# Patient Record
Sex: FEMALE | Race: WHITE | NOT HISPANIC OR LATINO | Employment: FULL TIME | URBAN - METROPOLITAN AREA
[De-identification: names, ages, dates, MRNs, and addresses within clinical notes are randomized per-mention and may not be internally consistent; named-entity substitution may affect disease eponyms.]

---

## 2019-05-23 ENCOUNTER — OFFICE VISIT (OUTPATIENT)
Dept: ORTHOPEDICS | Facility: CLINIC | Age: 59
End: 2019-05-23
Payer: COMMERCIAL

## 2019-05-23 VITALS
SYSTOLIC BLOOD PRESSURE: 150 MMHG | HEART RATE: 67 BPM | HEIGHT: 67 IN | DIASTOLIC BLOOD PRESSURE: 98 MMHG | BODY MASS INDEX: 26.53 KG/M2 | WEIGHT: 169 LBS

## 2019-05-23 DIAGNOSIS — M19.131 SLAC (SCAPHOLUNATE ADVANCED COLLAPSE) OF WRIST, RIGHT: Primary | ICD-10-CM

## 2019-05-23 PROCEDURE — 99203 OFFICE O/P NEW LOW 30 MIN: CPT | Mod: 57,S$GLB,, | Performed by: ORTHOPAEDIC SURGERY

## 2019-05-23 PROCEDURE — 99999 PR PBB SHADOW E&M-NEW PATIENT-LVL III: CPT | Mod: PBBFAC,,, | Performed by: ORTHOPAEDIC SURGERY

## 2019-05-23 PROCEDURE — 99999 PR PBB SHADOW E&M-NEW PATIENT-LVL III: ICD-10-PCS | Mod: PBBFAC,,, | Performed by: ORTHOPAEDIC SURGERY

## 2019-05-23 PROCEDURE — 99203 PR OFFICE/OUTPT VISIT, NEW, LEVL III, 30-44 MIN: ICD-10-PCS | Mod: 57,S$GLB,, | Performed by: ORTHOPAEDIC SURGERY

## 2019-05-23 PROCEDURE — 3008F BODY MASS INDEX DOCD: CPT | Mod: CPTII,S$GLB,, | Performed by: ORTHOPAEDIC SURGERY

## 2019-05-23 PROCEDURE — 3008F PR BODY MASS INDEX (BMI) DOCUMENTED: ICD-10-PCS | Mod: CPTII,S$GLB,, | Performed by: ORTHOPAEDIC SURGERY

## 2019-05-23 RX ORDER — MELOXICAM 15 MG/1
15 TABLET ORAL DAILY
Refills: 1 | COMMUNITY
Start: 2019-03-28 | End: 2019-07-17

## 2019-05-23 NOTE — H&P
"5/23/2019    Chief Complaint:  Chief Complaint   Patient presents with    Wrist Pain     right wrist pain for approx 6 mos       HPI:  Lakeshia Lopes is a 58 y.o. female, who presents to clinic today she has a history of right wrist arthritis. She states that her wrist has been hurting her significantly over the last 6 months.  She has tried bracing without significant relief.  She has even had a steroid injection in her wrist which is not provide significant relief.  She has seen Dr. echavarria to recur suggested surgery and she is here today for a 2nd opinion.  She has no other complaints.    PMHX:  History reviewed. No pertinent past medical history.    PSHX:  Past Surgical History:   Procedure Laterality Date    CARPAL TUNNEL RELEASE Right     TUBAL LIGATION         FMHX:  Family History   Problem Relation Age of Onset    Multiple sclerosis Mother     Cancer Father        SOCHX:  Social History     Tobacco Use    Smoking status: Never Smoker    Smokeless tobacco: Former User   Substance Use Topics    Alcohol use: Yes     Frequency: Never     Comment: social       ALLERGIES:  Patient has no known allergies.    CURRENT MEDICATIONS:  Current Outpatient Medications on File Prior to Visit   Medication Sig Dispense Refill    meloxicam (MOBIC) 15 MG tablet Take 15 mg by mouth once daily.   1     No current facility-administered medications on file prior to visit.        REVIEW OF SYSTEMS:  Review of Systems   Constitutional: Negative.    HENT: Negative for hearing loss, nosebleeds and sore throat.    Respiratory: Negative for shortness of breath and wheezing.    Cardiovascular: Negative for chest pain and palpitations.   Gastrointestinal: Negative for heartburn, nausea and vomiting.   Genitourinary: Negative for dysuria and urgency.   Skin: Negative.    Neurological: Negative for seizures, loss of consciousness and weakness.       GENERAL PHYSICAL EXAM:   BP (!) 150/98   Pulse 67   Ht 5' 7" (1.702 m)   Wt " 76.7 kg (169 lb)   BMI 26.47 kg/m²    GEN: well developed, well nourished, no acute distress   HENT: Normocephalic, atraumatic   EYES: No discharge, conjunctiva normal   NECK: Supple, non-tender   PULM: No wheezing, no respiratory distress   CV: RRR   ABD: Soft, non-tender    ORTHO EXAM:   Examination of the right hand and wrist reveals that there are changes consistent with arthritis in the wrist.  There is mild edema.  There are no skin changes or erythema.  Palpation does produce tenderness over the radiocarpal joint. There is mild volar tenderness as well.  Wrist range of motion is limited with extension of 20° and flexion of 20°.  She is able make a full composite fist and fully extend her fingers.  Sensation is intact in the median radial and ulnar distributions. She has a 2+ radial pulse.    RADIOLOGY:   X-rays of the right wrist have been reviewed she is noted to have scapholunate advanced collapse with severe degenerative changes about the scaphoid.  The capitate appears to be intact.  The lunate is mildly effective but the lunate fossa appears to be intact.    ASSESSMENT:   Right wrist scapholunate advanced collapse    PLAN:  1.  I have discussed treatment options with the patient. Due to the appearance of her x-ray I think she would do best with a proximal row carpectomy.  I did discuss the possibility of needing a total versus partial wrist arthrodesis.  I also discussed the possibility of conservative treatments.  Patient states that she has failed conservative treatments and would like to consider surgical option    2.  She will call or follow up with me if she would like to proceed with attempt at proximal row carpectomy

## 2019-05-28 ENCOUNTER — TELEPHONE (OUTPATIENT)
Dept: ORTHOPEDICS | Facility: CLINIC | Age: 59
End: 2019-05-28

## 2019-05-28 NOTE — TELEPHONE ENCOUNTER
I called pt and scheduled her to see Dr Irving on 5/29/19 at 2:40pm to discuss and address consents for surgery to start process of scheduling surgery.

## 2019-05-28 NOTE — TELEPHONE ENCOUNTER
----- Message from Russel Simpson sent at 5/28/2019  9:37 AM CDT -----  Contact: same  Patient called in and stated she was seen on 5/23/19 for her right wrist issues & would like a call back to go ahead & schedule her surgery.  Patient call back number is [phone]

## 2019-05-29 ENCOUNTER — OFFICE VISIT (OUTPATIENT)
Dept: ORTHOPEDICS | Facility: CLINIC | Age: 59
End: 2019-05-29
Payer: COMMERCIAL

## 2019-05-29 VITALS
HEART RATE: 64 BPM | SYSTOLIC BLOOD PRESSURE: 148 MMHG | WEIGHT: 169 LBS | BODY MASS INDEX: 26.53 KG/M2 | DIASTOLIC BLOOD PRESSURE: 90 MMHG | HEIGHT: 67 IN

## 2019-05-29 DIAGNOSIS — M19.131 SCAPHOLUNATE ADVANCED COLLAPSE OF RIGHT WRIST: Primary | ICD-10-CM

## 2019-05-29 DIAGNOSIS — Z01.818 PRE-OP EXAMINATION: Primary | ICD-10-CM

## 2019-05-29 PROCEDURE — 3008F PR BODY MASS INDEX (BMI) DOCUMENTED: ICD-10-PCS | Mod: CPTII,S$GLB,, | Performed by: ORTHOPAEDIC SURGERY

## 2019-05-29 PROCEDURE — 99999 PR PBB SHADOW E&M-EST. PATIENT-LVL IV: ICD-10-PCS | Mod: PBBFAC,,, | Performed by: ORTHOPAEDIC SURGERY

## 2019-05-29 PROCEDURE — 3008F BODY MASS INDEX DOCD: CPT | Mod: CPTII,S$GLB,, | Performed by: ORTHOPAEDIC SURGERY

## 2019-05-29 PROCEDURE — 99213 PR OFFICE/OUTPT VISIT, EST, LEVL III, 20-29 MIN: ICD-10-PCS | Mod: S$GLB,,, | Performed by: ORTHOPAEDIC SURGERY

## 2019-05-29 PROCEDURE — 99999 PR PBB SHADOW E&M-EST. PATIENT-LVL IV: CPT | Mod: PBBFAC,,, | Performed by: ORTHOPAEDIC SURGERY

## 2019-05-29 PROCEDURE — 99213 OFFICE O/P EST LOW 20 MIN: CPT | Mod: S$GLB,,, | Performed by: ORTHOPAEDIC SURGERY

## 2019-05-29 RX ORDER — LIDOCAINE HYDROCHLORIDE 10 MG/ML
1 INJECTION, SOLUTION EPIDURAL; INFILTRATION; INTRACAUDAL; PERINEURAL ONCE
Status: CANCELLED | OUTPATIENT
Start: 2019-05-29 | End: 2019-05-29

## 2019-05-29 NOTE — H&P
"5/29/2019    Chief Complaint:  Chief Complaint   Patient presents with    Wrist Pain     right wrist pain (scapholunate advanced collapse): discuss sx       HPI:  Lakeshia Lopes is a 58 y.o. female, who presents to clinic today she has a history of scapholunate advanced collapse.  She has been considering surgery. She is here today for discussion of treatment options.  She has no new complaints.  She still complains of right wrist pain    PMHX:  History reviewed. No pertinent past medical history.    PSHX:  Past Surgical History:   Procedure Laterality Date    CARPAL TUNNEL RELEASE Right     TUBAL LIGATION         FMHX:  Family History   Problem Relation Age of Onset    Multiple sclerosis Mother     Cancer Father        SOCHX:  Social History     Tobacco Use    Smoking status: Never Smoker    Smokeless tobacco: Former User   Substance Use Topics    Alcohol use: Yes     Frequency: Never     Comment: social       ALLERGIES:  Patient has no known allergies.    CURRENT MEDICATIONS:  Current Outpatient Medications on File Prior to Visit   Medication Sig Dispense Refill    meloxicam (MOBIC) 15 MG tablet Take 15 mg by mouth once daily.   1     No current facility-administered medications on file prior to visit.        REVIEW OF SYSTEMS:  Review of Systems   Constitutional: Negative.    HENT: Negative for hearing loss, nosebleeds and sore throat.    Respiratory: Negative for shortness of breath and wheezing.    Cardiovascular: Negative for chest pain and palpitations.   Gastrointestinal: Negative for heartburn, nausea and vomiting.   Genitourinary: Negative for dysuria and urgency.   Skin: Negative.    Neurological: Negative for seizures, loss of consciousness and weakness.       GENERAL PHYSICAL EXAM:   BP (!) 148/90   Pulse 64   Ht 5' 7" (1.702 m)   Wt 76.7 kg (169 lb)   BMI 26.47 kg/m²    GEN: well developed, well nourished, no acute distress   HENT: Normocephalic, atraumatic   EYES: No discharge, " conjunctiva normal   NECK: Supple, non-tender   PULM: No wheezing, no respiratory distress   CV: RRR   ABD: Soft, non-tender    ORTHO EXAM:   Examination of the right hand and wrist reveals that there are changes consistent with arthritis in the wrist.  There is mild edema.  There are no skin changes or erythema.  Palpation does produce tenderness over the radiocarpal joint. There is mild volar tenderness as well.  Wrist range of motion is limited with extension of 20° and flexion of 20°.  She is able make a full composite fist and fully extend her fingers.  Sensation is intact in the median radial and ulnar distributions. She has a 2+ radial pulse.    RADIOLOGY:   X-rays of the right wrist have been reviewed.  She is noted have scapholunate advanced collapse.  There is severe arthritis at the radioscaphoid joint. The capitate appears to be preserved as well as the lunate fossa.    ASSESSMENT:   Right wrist scapholunate advanced collapse    PLAN:  1.  I have discussed treatment options with the patient. She states the conservative treatments are not providing relief.  I have discussed the possibility of proximal row carpectomy versus wrist arthrodesis.  Patient states an understanding of the risks and benefits of these procedures and has provided informed consent    2.  Will proceed with right wrist proximal row carpectomy under general anesthesia    3.  Will send her for preoperative CBC, BMP, and EKG    4.  She will follow up with me 2 weeks postoperatively

## 2019-05-29 NOTE — PATIENT INSTRUCTIONS
Surgery Instructions:     Your surgery is scheduled on 6/18/2019  at the surgery center: [address], 1st floor, second entrance.    The pre-op department will be in contact with you prior to your procedure to review medications and instructions.       Nothing to eat or drink after midnight prior to day of surgery.    You should STOP taking any blood thinners 7 days prior to surgery.     All preoperative testing should be done as soon as possible as it may be needed to obtain clearance.    The surgery center will contact you the day prior to surgery to advise you of your arrival time for surgery.     Your post op appointment is scheduled on 7/3/19 at 8:00 AM.

## 2019-06-04 ENCOUNTER — CLINICAL SUPPORT (OUTPATIENT)
Dept: CARDIOLOGY | Facility: CLINIC | Age: 59
End: 2019-06-04
Payer: COMMERCIAL

## 2019-06-04 ENCOUNTER — LAB VISIT (OUTPATIENT)
Dept: LAB | Facility: HOSPITAL | Age: 59
End: 2019-06-04
Attending: ORTHOPAEDIC SURGERY
Payer: COMMERCIAL

## 2019-06-04 DIAGNOSIS — Z01.818 PRE-OP EXAMINATION: ICD-10-CM

## 2019-06-04 LAB
ANION GAP SERPL CALC-SCNC: 7 MMOL/L (ref 8–16)
BASOPHILS # BLD AUTO: 0.05 K/UL (ref 0–0.2)
BASOPHILS NFR BLD: 0.5 % (ref 0–1.9)
BUN SERPL-MCNC: 23 MG/DL (ref 6–20)
CALCIUM SERPL-MCNC: 9.3 MG/DL (ref 8.7–10.5)
CHLORIDE SERPL-SCNC: 107 MMOL/L (ref 95–110)
CO2 SERPL-SCNC: 27 MMOL/L (ref 23–29)
CREAT SERPL-MCNC: 1.1 MG/DL (ref 0.5–1.4)
DIFFERENTIAL METHOD: NORMAL
EOSINOPHIL # BLD AUTO: 0.1 K/UL (ref 0–0.5)
EOSINOPHIL NFR BLD: 1 % (ref 0–8)
ERYTHROCYTE [DISTWIDTH] IN BLOOD BY AUTOMATED COUNT: 11.8 % (ref 11.5–14.5)
EST. GFR  (AFRICAN AMERICAN): >60 ML/MIN/1.73 M^2
EST. GFR  (NON AFRICAN AMERICAN): 55.1 ML/MIN/1.73 M^2
GLUCOSE SERPL-MCNC: 80 MG/DL (ref 70–110)
HCT VFR BLD AUTO: 40 % (ref 37–48.5)
HGB BLD-MCNC: 13.2 G/DL (ref 12–16)
IMM GRANULOCYTES # BLD AUTO: 0.03 K/UL (ref 0–0.04)
IMM GRANULOCYTES NFR BLD AUTO: 0.3 % (ref 0–0.5)
LYMPHOCYTES # BLD AUTO: 3.1 K/UL (ref 1–4.8)
LYMPHOCYTES NFR BLD: 32.4 % (ref 18–48)
MCH RBC QN AUTO: 30.4 PG (ref 27–31)
MCHC RBC AUTO-ENTMCNC: 33 G/DL (ref 32–36)
MCV RBC AUTO: 92 FL (ref 82–98)
MONOCYTES # BLD AUTO: 0.6 K/UL (ref 0.3–1)
MONOCYTES NFR BLD: 6.1 % (ref 4–15)
NEUTROPHILS # BLD AUTO: 5.7 K/UL (ref 1.8–7.7)
NEUTROPHILS NFR BLD: 59.7 % (ref 38–73)
NRBC BLD-RTO: 0 /100 WBC
PLATELET # BLD AUTO: 292 K/UL (ref 150–350)
PMV BLD AUTO: 9.9 FL (ref 9.2–12.9)
POTASSIUM SERPL-SCNC: 4.6 MMOL/L (ref 3.5–5.1)
RBC # BLD AUTO: 4.34 M/UL (ref 4–5.4)
SODIUM SERPL-SCNC: 141 MMOL/L (ref 136–145)
WBC # BLD AUTO: 9.55 K/UL (ref 3.9–12.7)

## 2019-06-04 PROCEDURE — 93000 ELECTROCARDIOGRAM COMPLETE: CPT | Mod: S$GLB,,, | Performed by: INTERNAL MEDICINE

## 2019-06-04 PROCEDURE — 93000 EKG 12-LEAD: ICD-10-PCS | Mod: S$GLB,,, | Performed by: INTERNAL MEDICINE

## 2019-06-04 PROCEDURE — 85025 COMPLETE CBC W/AUTO DIFF WBC: CPT

## 2019-06-04 PROCEDURE — 36415 COLL VENOUS BLD VENIPUNCTURE: CPT | Mod: PO

## 2019-06-04 PROCEDURE — 80048 BASIC METABOLIC PNL TOTAL CA: CPT

## 2019-06-11 ENCOUNTER — TELEPHONE (OUTPATIENT)
Dept: ORTHOPEDICS | Facility: CLINIC | Age: 59
End: 2019-06-11

## 2019-06-11 NOTE — TELEPHONE ENCOUNTER
----- Message from Escobar Kaur sent at 6/11/2019  3:05 PM CDT -----  Contact: pt  Please call Ms Lopes MRN:   [id number] at  for question about Sx

## 2019-06-11 NOTE — TELEPHONE ENCOUNTER
I called pt.  Pt called to inform me she has completed her labs and EKG.  I informed pt that the results are in her chart and will be reviewed by the anethesiologist.  Pt asked if she is still on for surgery.  I informed pt she will be contacted by the surgery center the day before surgery to notify pt time of arrival for surgery.  Pt verbalized understanding and had no other questions.

## 2019-06-13 ENCOUNTER — TELEPHONE (OUTPATIENT)
Dept: ORTHOPEDICS | Facility: CLINIC | Age: 59
End: 2019-06-13

## 2019-06-13 RX ORDER — GLUCOSAMINE/CHONDRO SU A 500-400 MG
1 TABLET ORAL 3 TIMES DAILY
COMMUNITY

## 2019-06-13 RX ORDER — ASPIRIN 81 MG/1
81 TABLET ORAL DAILY
COMMUNITY

## 2019-06-13 NOTE — TELEPHONE ENCOUNTER
Attempted to contact pt to notify her that I received her [place] paperwork and to ask pt when beginning date of incapacity should be listed on form.  No answer. Unable to leave message.

## 2019-06-14 NOTE — TELEPHONE ENCOUNTER
I called and informed pt that I have received her [facility] paperwork.  Pt reports her first day of incapacity with be the day of surgery.  I informed pt I will complete [facility] paperwork and fax to appropriate number.  Pt verbalized understanding and had no questions.

## 2019-06-17 ENCOUNTER — ANESTHESIA EVENT (OUTPATIENT)
Dept: SURGERY | Facility: HOSPITAL | Age: 59
End: 2019-06-17
Payer: COMMERCIAL

## 2019-06-17 DIAGNOSIS — M25.531 RIGHT WRIST PAIN: Primary | ICD-10-CM

## 2019-06-18 ENCOUNTER — ANESTHESIA (OUTPATIENT)
Dept: SURGERY | Facility: HOSPITAL | Age: 59
End: 2019-06-18
Payer: COMMERCIAL

## 2019-06-18 ENCOUNTER — HOSPITAL ENCOUNTER (OUTPATIENT)
Dept: RADIOLOGY | Facility: HOSPITAL | Age: 59
Discharge: HOME | End: 2019-06-18
Attending: ORTHOPAEDIC SURGERY | Admitting: ORTHOPAEDIC SURGERY
Payer: COMMERCIAL

## 2019-06-18 ENCOUNTER — HOSPITAL ENCOUNTER (OUTPATIENT)
Facility: HOSPITAL | Age: 59
Discharge: HOME | End: 2019-06-18
Attending: ORTHOPAEDIC SURGERY | Admitting: ORTHOPAEDIC SURGERY
Payer: COMMERCIAL

## 2019-06-18 DIAGNOSIS — M19.131 SCAPHOLUNATE ADVANCED COLLAPSE OF RIGHT WRIST: ICD-10-CM

## 2019-06-18 DIAGNOSIS — M25.531 RIGHT WRIST PAIN: ICD-10-CM

## 2019-06-18 PROCEDURE — 76942 PR U/S GUIDANCE FOR NEEDLE GUIDANCE: ICD-10-PCS | Mod: 26,,, | Performed by: ANESTHESIOLOGY

## 2019-06-18 PROCEDURE — 63600175 PHARM REV CODE 636 W HCPCS: Mod: PO | Performed by: NURSE ANESTHETIST, CERTIFIED REGISTERED

## 2019-06-18 PROCEDURE — 64772 PR TRANSECT OTHR SPINAL N,XTRADURAL: ICD-10-PCS | Mod: 51,RT,, | Performed by: ORTHOPAEDIC SURGERY

## 2019-06-18 PROCEDURE — 76942 ECHO GUIDE FOR BIOPSY: CPT | Mod: 26,,, | Performed by: ANESTHESIOLOGY

## 2019-06-18 PROCEDURE — 64415 PR NERVE BLOCK INJ, ANES/STEROID, BRACHIAL PLEXUS, INCL IMAG GUIDANCE: ICD-10-PCS | Mod: 59,RT,, | Performed by: ANESTHESIOLOGY

## 2019-06-18 PROCEDURE — 76000 FLUOROSCOPY <1 HR PHYS/QHP: CPT | Mod: TC,PO

## 2019-06-18 PROCEDURE — 64415 NJX AA&/STRD BRCH PLXS IMG: CPT | Mod: PO | Performed by: ANESTHESIOLOGY

## 2019-06-18 PROCEDURE — 37000008 HC ANESTHESIA 1ST 15 MINUTES: Mod: PO | Performed by: ORTHOPAEDIC SURGERY

## 2019-06-18 PROCEDURE — 76942 ECHO GUIDE FOR BIOPSY: CPT | Mod: PO | Performed by: ANESTHESIOLOGY

## 2019-06-18 PROCEDURE — 64415 NJX AA&/STRD BRCH PLXS IMG: CPT | Mod: 59,RT,, | Performed by: ANESTHESIOLOGY

## 2019-06-18 PROCEDURE — D9220A PRA ANESTHESIA: Mod: ANES,,, | Performed by: ANESTHESIOLOGY

## 2019-06-18 PROCEDURE — 64772 INCISION OF SPINAL NERVE: CPT | Mod: 51,RT,, | Performed by: ORTHOPAEDIC SURGERY

## 2019-06-18 PROCEDURE — 37000009 HC ANESTHESIA EA ADD 15 MINS: Mod: PO | Performed by: ORTHOPAEDIC SURGERY

## 2019-06-18 PROCEDURE — D9220A PRA ANESTHESIA: ICD-10-PCS | Mod: ANES,,, | Performed by: ANESTHESIOLOGY

## 2019-06-18 PROCEDURE — 25000003 PHARM REV CODE 250: Mod: PO | Performed by: ANESTHESIOLOGY

## 2019-06-18 PROCEDURE — 36000706: Mod: PO | Performed by: ORTHOPAEDIC SURGERY

## 2019-06-18 PROCEDURE — 36000707: Mod: PO | Performed by: ORTHOPAEDIC SURGERY

## 2019-06-18 PROCEDURE — 63600175 PHARM REV CODE 636 W HCPCS: Mod: PO | Performed by: ORTHOPAEDIC SURGERY

## 2019-06-18 PROCEDURE — 63600175 PHARM REV CODE 636 W HCPCS: Mod: PO | Performed by: ANESTHESIOLOGY

## 2019-06-18 PROCEDURE — 25230 PR REMOVAL OF RADIAL STYLOID: ICD-10-PCS | Mod: 51,RT,, | Performed by: ORTHOPAEDIC SURGERY

## 2019-06-18 PROCEDURE — D9220A PRA ANESTHESIA: ICD-10-PCS | Mod: CRNA,,, | Performed by: NURSE ANESTHETIST, CERTIFIED REGISTERED

## 2019-06-18 PROCEDURE — C9290 INJ, BUPIVACAINE LIPOSOME: HCPCS | Mod: PO | Performed by: ANESTHESIOLOGY

## 2019-06-18 PROCEDURE — 25215 PR REMOVAL OF PROX ROW CARPAL BONES: ICD-10-PCS | Mod: RT,,, | Performed by: ORTHOPAEDIC SURGERY

## 2019-06-18 PROCEDURE — 71000033 HC RECOVERY, INTIAL HOUR: Mod: PO | Performed by: ORTHOPAEDIC SURGERY

## 2019-06-18 PROCEDURE — 25215 REMOVAL OF WRIST BONES: CPT | Mod: RT,,, | Performed by: ORTHOPAEDIC SURGERY

## 2019-06-18 PROCEDURE — S0020 INJECTION, BUPIVICAINE HYDRO: HCPCS | Mod: PO | Performed by: ANESTHESIOLOGY

## 2019-06-18 PROCEDURE — D9220A PRA ANESTHESIA: Mod: CRNA,,, | Performed by: NURSE ANESTHETIST, CERTIFIED REGISTERED

## 2019-06-18 PROCEDURE — 25000003 PHARM REV CODE 250: Mod: PO | Performed by: NURSE ANESTHETIST, CERTIFIED REGISTERED

## 2019-06-18 PROCEDURE — 71000015 HC POSTOP RECOV 1ST HR: Mod: PO | Performed by: ORTHOPAEDIC SURGERY

## 2019-06-18 PROCEDURE — 27200651 HC AIRWAY, LMA: Mod: PO | Performed by: NURSE ANESTHETIST, CERTIFIED REGISTERED

## 2019-06-18 PROCEDURE — 25000003 PHARM REV CODE 250: Mod: PO | Performed by: ORTHOPAEDIC SURGERY

## 2019-06-18 PROCEDURE — 25230 PARTIAL REMOVAL OF RADIUS: CPT | Mod: 51,RT,, | Performed by: ORTHOPAEDIC SURGERY

## 2019-06-18 PROCEDURE — 27200750 HC INSULATED NEEDLE/ STIMUPLEX: Mod: PO | Performed by: ANESTHESIOLOGY

## 2019-06-18 RX ORDER — MIDAZOLAM HYDROCHLORIDE 1 MG/ML
0.5 INJECTION INTRAMUSCULAR; INTRAVENOUS
Status: DISCONTINUED | OUTPATIENT
Start: 2019-06-18 | End: 2019-06-18 | Stop reason: HOSPADM

## 2019-06-18 RX ORDER — FENTANYL CITRATE 50 UG/ML
INJECTION, SOLUTION INTRAMUSCULAR; INTRAVENOUS
Status: DISCONTINUED | OUTPATIENT
Start: 2019-06-18 | End: 2019-06-18

## 2019-06-18 RX ORDER — ONDANSETRON 2 MG/ML
INJECTION INTRAMUSCULAR; INTRAVENOUS
Status: DISCONTINUED | OUTPATIENT
Start: 2019-06-18 | End: 2019-06-18

## 2019-06-18 RX ORDER — FENTANYL CITRATE 50 UG/ML
25 INJECTION, SOLUTION INTRAMUSCULAR; INTRAVENOUS EVERY 5 MIN PRN
Status: DISCONTINUED | OUTPATIENT
Start: 2019-06-18 | End: 2019-06-18 | Stop reason: HOSPADM

## 2019-06-18 RX ORDER — HYDROMORPHONE HYDROCHLORIDE 2 MG/ML
0.2 INJECTION, SOLUTION INTRAMUSCULAR; INTRAVENOUS; SUBCUTANEOUS EVERY 5 MIN PRN
Status: DISCONTINUED | OUTPATIENT
Start: 2019-06-18 | End: 2019-06-18 | Stop reason: HOSPADM

## 2019-06-18 RX ORDER — LIDOCAINE HYDROCHLORIDE 10 MG/ML
1 INJECTION, SOLUTION EPIDURAL; INFILTRATION; INTRACAUDAL; PERINEURAL ONCE
Status: DISCONTINUED | OUTPATIENT
Start: 2019-06-18 | End: 2019-06-18 | Stop reason: HOSPADM

## 2019-06-18 RX ORDER — KETAMINE HYDROCHLORIDE 100 MG/ML
INJECTION, SOLUTION INTRAMUSCULAR; INTRAVENOUS
Status: DISCONTINUED | OUTPATIENT
Start: 2019-06-18 | End: 2019-06-18

## 2019-06-18 RX ORDER — SODIUM CHLORIDE, SODIUM LACTATE, POTASSIUM CHLORIDE, CALCIUM CHLORIDE 600; 310; 30; 20 MG/100ML; MG/100ML; MG/100ML; MG/100ML
INJECTION, SOLUTION INTRAVENOUS CONTINUOUS
Status: DISCONTINUED | OUTPATIENT
Start: 2019-06-18 | End: 2019-06-18 | Stop reason: HOSPADM

## 2019-06-18 RX ORDER — CEFAZOLIN SODIUM 2 G/50ML
2 SOLUTION INTRAVENOUS
Status: COMPLETED | OUTPATIENT
Start: 2019-06-18 | End: 2019-06-18

## 2019-06-18 RX ORDER — OXYCODONE HYDROCHLORIDE 10 MG/1
10 TABLET ORAL EVERY 4 HOURS PRN
Qty: 22 TABLET | Refills: 0 | Status: SHIPPED | OUTPATIENT
Start: 2019-06-18 | End: 2019-07-17

## 2019-06-18 RX ORDER — EPHEDRINE SULFATE 50 MG/ML
INJECTION, SOLUTION INTRAVENOUS
Status: DISCONTINUED | OUTPATIENT
Start: 2019-06-18 | End: 2019-06-18

## 2019-06-18 RX ORDER — OXYCODONE HYDROCHLORIDE 5 MG/1
5 TABLET ORAL
Status: DISCONTINUED | OUTPATIENT
Start: 2019-06-18 | End: 2019-06-18 | Stop reason: HOSPADM

## 2019-06-18 RX ORDER — DEXAMETHASONE SODIUM PHOSPHATE 4 MG/ML
8 INJECTION, SOLUTION INTRA-ARTICULAR; INTRALESIONAL; INTRAMUSCULAR; INTRAVENOUS; SOFT TISSUE
Status: COMPLETED | OUTPATIENT
Start: 2019-06-18 | End: 2019-06-18

## 2019-06-18 RX ORDER — SODIUM CHLORIDE 0.9 G/100ML
IRRIGANT IRRIGATION
Status: DISCONTINUED | OUTPATIENT
Start: 2019-06-18 | End: 2019-06-18 | Stop reason: HOSPADM

## 2019-06-18 RX ORDER — ONDANSETRON 4 MG/1
4 TABLET, ORALLY DISINTEGRATING ORAL EVERY 8 HOURS PRN
Qty: 4 TABLET | Refills: 0 | Status: SHIPPED | OUTPATIENT
Start: 2019-06-18 | End: 2019-07-17

## 2019-06-18 RX ORDER — PROPOFOL 10 MG/ML
VIAL (ML) INTRAVENOUS
Status: DISCONTINUED | OUTPATIENT
Start: 2019-06-18 | End: 2019-06-18

## 2019-06-18 RX ORDER — GLYCOPYRROLATE 0.2 MG/ML
INJECTION INTRAMUSCULAR; INTRAVENOUS
Status: DISCONTINUED | OUTPATIENT
Start: 2019-06-18 | End: 2019-06-18

## 2019-06-18 RX ORDER — LIDOCAINE HCL/PF 100 MG/5ML
SYRINGE (ML) INTRAVENOUS
Status: DISCONTINUED | OUTPATIENT
Start: 2019-06-18 | End: 2019-06-18

## 2019-06-18 RX ORDER — BUPIVACAINE HYDROCHLORIDE 5 MG/ML
INJECTION, SOLUTION EPIDURAL; INTRACAUDAL
Status: COMPLETED | OUTPATIENT
Start: 2019-06-18 | End: 2019-06-18

## 2019-06-18 RX ADMIN — MIDAZOLAM HYDROCHLORIDE 1 MG: 1 INJECTION, SOLUTION INTRAMUSCULAR; INTRAVENOUS at 08:06

## 2019-06-18 RX ADMIN — FENTANYL CITRATE 50 MCG: 50 INJECTION INTRAMUSCULAR; INTRAVENOUS at 08:06

## 2019-06-18 RX ADMIN — SODIUM CHLORIDE, SODIUM LACTATE, POTASSIUM CHLORIDE, AND CALCIUM CHLORIDE: .6; .31; .03; .02 INJECTION, SOLUTION INTRAVENOUS at 08:06

## 2019-06-18 RX ADMIN — PROPOFOL 180 MG: 10 INJECTION, EMULSION INTRAVENOUS at 09:06

## 2019-06-18 RX ADMIN — FENTANYL CITRATE 50 MCG: 50 INJECTION, SOLUTION INTRAMUSCULAR; INTRAVENOUS at 09:06

## 2019-06-18 RX ADMIN — EPHEDRINE SULFATE 10 MG: 50 INJECTION, SOLUTION INTRAMUSCULAR; INTRAVENOUS; SUBCUTANEOUS at 10:06

## 2019-06-18 RX ADMIN — LIDOCAINE HYDROCHLORIDE 75 MG: 20 INJECTION PARENTERAL at 09:06

## 2019-06-18 RX ADMIN — KETAMINE HYDROCHLORIDE 25 MG: 100 INJECTION, SOLUTION, CONCENTRATE INTRAMUSCULAR; INTRAVENOUS at 09:06

## 2019-06-18 RX ADMIN — BUPIVACAINE HYDROCHLORIDE 10 ML: 5 INJECTION, SOLUTION EPIDURAL; INTRACAUDAL; PERINEURAL at 08:06

## 2019-06-18 RX ADMIN — DEXAMETHASONE SODIUM PHOSPHATE 8 MG: 4 INJECTION, SOLUTION INTRAMUSCULAR; INTRAVENOUS at 08:06

## 2019-06-18 RX ADMIN — CEFAZOLIN SODIUM 2 G: 2 SOLUTION INTRAVENOUS at 09:06

## 2019-06-18 RX ADMIN — ONDANSETRON 4 MG: 2 INJECTION, SOLUTION INTRAMUSCULAR; INTRAVENOUS at 11:06

## 2019-06-18 RX ADMIN — EPHEDRINE SULFATE 5 MG: 50 INJECTION, SOLUTION INTRAMUSCULAR; INTRAVENOUS; SUBCUTANEOUS at 10:06

## 2019-06-18 RX ADMIN — BUPIVACAINE 10 ML: 13.3 INJECTION, SUSPENSION, LIPOSOMAL INFILTRATION at 08:06

## 2019-06-18 RX ADMIN — GLYCOPYRROLATE 0.2 MG: 0.2 INJECTION, SOLUTION INTRAMUSCULAR; INTRAVENOUS at 09:06

## 2019-06-18 NOTE — BRIEF OP NOTE
[facility]  Brief Operative Note      SUMMARY     Surgery Date: 6/18/2019     Surgeon(s) and Role:     * Alex Irving MD - Primary    Assisting Surgeon: None    Pre-op Diagnosis:  Scapholunate advanced collapse of right wrist [M19.131]    Post-op Diagnosis: Post-Op Diagnosis Codes:     * Scapholunate advanced collapse of right wrist [M19.131]    Procedure Performed:  Right wrist proximal row carpectomy    Procedure(s) (LRB):  DEBRIDEMENT, CARPAL BONE (Right)    Technical Procedures Used:  Right wrist proximal row carpectomy    Description of the findings of the procedure:  Scapholunate advanced collapse with severe arthritis of the radioscaphoid joint    Estimated Blood Loss: * No values recorded between 6/18/2019 10:09 AM and 6/18/2019 11:40 AM *         Specimens:   Specimen (12h ago, onward)    None

## 2019-06-18 NOTE — DISCHARGE INSTRUCTIONS
Discharge Instructions: After Your Surgery  Youve just had surgery. During surgery, you were given medicine called anesthesia to keep you relaxed and free of pain. After surgery, you may have some pain or nausea. This is common. Here are some tips for feeling better and getting well after surgery.     Stay on schedule with your medicine.   Going home  Your healthcare provider will show you how to take care of yourself when you go home. He or she will also answer your questions. Have an adult family member or friend drive you home. For the first 24 hours after your surgery:  · Do not drive or use heavy equipment.  · Do not make important decisions or sign legal papers.  · Do not drink alcohol.  · Have someone stay with you, if needed. He or she can watch for problems and help keep you safe.  Be sure to go to all follow-up visits with your healthcare provider. And rest after your surgery for as long as your healthcare provider tells you to.  Coping with pain  If you have pain after surgery, pain medicine will help you feel better. Take it as told, before pain becomes severe. Also, ask your healthcare provider or pharmacist about other ways to control pain. This might be with heat, ice, or relaxation. And follow any other instructions your surgeon or nurse gives you.  Tips for taking pain medicine  To get the best relief possible, remember these points:  · Pain medicines can upset your stomach. Taking them with a little food may help.  · Most pain relievers taken by mouth need at least 20 to 30 minutes to start to work.  · Taking medicine on a schedule can help you remember to take it. Try to time your medicine so that you can take it before starting an activity. This might be before you get dressed, go for a walk, or sit down for dinner.  · Constipation is a common side effect of pain medicines. Call your healthcare provider before taking any medicines such as laxatives or stool softeners to help ease  constipation. Also ask if you should skip any foods. Drinking lots of fluids and eating foods such as fruits and vegetables that are high in fiber can also help. Remember, do not take laxatives unless your surgeon has prescribed them.  · Drinking alcohol and taking pain medicine can cause dizziness and slow your breathing. It can even be deadly. Do not drink alcohol while taking pain medicine.  · Pain medicine can make you react more slowly to things. Do not drive or run machinery while taking pain medicine.  Your healthcare provider may tell you to take acetaminophen to help ease your pain. Ask him or her how much you are supposed to take each day. Acetaminophen or other pain relievers may interact with your prescription medicines or other over-the-counter (OTC) medicines. Some prescription medicines have acetaminophen and other ingredients. Using both prescription and OTC acetaminophen for pain can cause you to overdose. Read the labels on your OTC medicines with care. This will help you to clearly know the list of ingredients, how much to take, and any warnings. It may also help you not take too much acetaminophen. If you have questions or do not understand the information, ask your pharmacist or healthcare provider to explain it to you before you take the OTC medicine.  Managing nausea  Some people have an upset stomach after surgery. This is often because of anesthesia, pain, or pain medicine, or the stress of surgery. These tips will help you handle nausea and eat healthy foods as you get better. If you were on a special food plan before surgery, ask your healthcare provider if you should follow it while you get better. These tips may help:  · Do not push yourself to eat. Your body will tell you when to eat and how much.  · Start off with clear liquids and soup. They are easier to digest.  · Next try semi-solid foods, such as mashed potatoes, applesauce, and gelatin, as you feel ready.  · Slowly move to solid  foods. Dont eat fatty, rich, or spicy foods at first.  · Do not force yourself to have 3 large meals a day. Instead eat smaller amounts more often.  · Take pain medicines with a small amount of solid food, such as crackers or toast, to avoid nausea.     Call your surgeon if  · You still have pain an hour after taking medicine. The medicine may not be strong enough.  · You feel too sleepy, dizzy, or groggy. The medicine may be too strong.  · You have side effects like nausea, vomiting, or skin changes, such as rash, itching, or hives.       If you have obstructive sleep apnea  You were given anesthesia medicine during surgery to keep you comfortable and free of pain. After surgery, you may have more apnea spells because of this medicine and other medicines you were given. The spells may last longer than usual.   At home:  · Keep using the continuous positive airway pressure (CPAP) device when you sleep. Unless your healthcare provider tells you not to, use it when you sleep, day or night. CPAP is a common device used to treat obstructive sleep apnea.  · Talk with your provider before taking any pain medicine, muscle relaxants, or sedatives. Your provider will tell you about the possible dangers of taking these medicines.  Date Last Reviewed: 12/1/2016 © 2000-2017 [employer] [address] [place] [address], [place] [address]. All rights reserved. This information is not intended as a substitute for professional medical care. Always follow your healthcare professional's instructions.    Exparel(bupivacaine) has been injected to provide approximately 72 hours of reduced pain after your surgery.  Do not remove the bracelet for five days  Report to your doctor as soon as possible the following:   Restlessness   Anxiety   Speech problems,    Lightheadedness   Numbness and tingling of the mouth and lips   Seizures    Metallic taste   Blurred vision   Tremors    Twitching   Depression   Extreme drowsiness  Avoid  additional use of local anesthetics (such as dental procedures) for five days (96 hours)       Procedure: Right wrist proximal row carpectomy    1. Keep the splint clean, dry, and in place until follow-up. Do not take it off and do not get it wet.    2. Please keep the right upper extremity elevated for the 1st 24-48 hours to prevent further swelling    3. Flexion and extension of the exposed fingers is allowed but do not attempt to lift or push off with the right arm or hand    4. Pain medication and nausea medication have been prescribed. Please take them as necessary.    5. If there are any questions or concerns please call Dr. Irving's office.    6. Follow-up with Dr. Irving in 2 weeks

## 2019-06-18 NOTE — OP NOTE
Lakeshia Lopes  1960    DATE OF SURGERY: 6/18/2019     PRE-OPERATIVE DIAGNOSIS:  Right wrist scapholunate advanced collapse    POST-OPERATIVE DIAGNOSIS:  Right wrist scapholunate advanced collapse     ANESTHESIA TYPE:  General with a single-shot supraclavicular block    BLOOD LOSS:  Less than 10 cc    TOURNIQUET TIME:  Approximately 65 min    SURGEON: Dr Irving    ASSISTANT: Felicita Haile     PROCEDURE:    1. Right wrist proximal row carpectomy. (61662)  2. Right wrist radial styloidectomy. ([id number])  3. Right wrist posterior interosseous nerve neurectomy (74958)    IMPLANTS:  None     SPECIMENS:  None    INDICATION:     Ms. Lopes had a history of scapholunate injury.  She developed stage II scapholunate advanced collapse.  She was having severe pain and limitation of function. I had a discussion with the patient about scaphoid excision and 4 corner fusion versus proximal row carpectomy.  After discussion of the treatment options as well as risks and benefits of all the treatments informed consent was obtained proceed with right wrist proximal row carpectomy    PROCEDURE IN DETAIL:     Ms. Lopes was transported to the operating room and was placed supine on the operating room table. All appropriate points were padded. The right arm and hand was prepped and draped in the normal sterile fashion. Time out was called. The correct patient, correct operative site, correct procedure, antibiotic administration which consisted of 2 g of Ancef, and allergies to medications which are to Patient has no known allergies.  were reviewed. Time in was then called.     Attention was turned to the right wrist.  A 5-6 cm incision was made directly over the dorsum of the wrist. The incision was carried through the skin.  Subcutaneous tissues were dissected with tenotomy scissors.  The extensor retinaculum and extensor tendons were identified.  The extensor retinaculum was incised in the region between the 3rd and 4th  extensor compartment.  The EPL tendon was transposed radially from within its sheath.  The finger extensors were retracted ulnarly.  In the floor of the 4th extensor compartment was noted to be the terminal branch of the superficial radial nerve. A neurectomy was performed proximal to the level of the wrist. Approximately 1 cm of nerve was excised.    Attention was then turned to the joint capsule.  A distally based capsular flap was created.  The dorsal capsule was elevated.  The proximal row of the carpus was identified.  The capitate was visualized and there was noted to be good cartilage covering over the proximal capitate.  The lunate fossa was also without significant cartilage change.  There was noted to be severe articular surface injury with full-thickness cartilage loss over the entire scaphoid and the entire scaphoid facet.  At that point I elected to proceed with the proximal row carpectomy.  With a combination of blunt and sharp dissection the lunate, triquetrum, and scaphoid were removed sequentially.  These were removed without significant difficulty.  Fluoroscopy was then used to assess the carpectomy.  There was noted be complete excision of the proximal 3 bones.  There was noted to be impaction of the trapezium over the level of the radial styloid.    Attention was then turned to the radial styloid.  Dissection on the distal portion of the radial styloid was performed bluntly with a [place] elevator.  With adequate visualization and soft tissue elevation an osteotome was used and the distal 1 cm of radial styloid was excised. Fluoroscopic images were obtained and there was noted to be no further abutment on the radial side of the wrist. The remainder of the wrist was tracking well and the capitate was noted to be well-seated within the lunate fossa.    The wound was then copiously irrigated. The tourniquet was let down and hemostasis was obtained. The dorsal capsule and dorsal ligaments were repaired  with 3-0 Ethibond suture. The extensor retinaculum was repaired with 2-0 Vicryl suture.  The skin was then closed with a combination of 3-0 Vicryl subcutaneous sutures and 3-0 nylon superficial sutures. The wound was then dressed with Xeroform, gauze pad, cast padding and a short-arm volar splint was placed.    The patient was awakened from anesthesia and was transported to the recovery room in stable condition. All lap, needle, sponge, and equipment counts were correct at the end of the case.    POST-OPERATIVE PLAN:     The patient will maintain the splint full-time for 2 weeks.  At the 2 week bryan and may consider placing her into a short-arm cast versus Velcro splint.  We will begin range of motion of the wrist at the 2-4 week bryan depending on her recovery.

## 2019-06-18 NOTE — ANESTHESIA PREPROCEDURE EVALUATION
06/18/2019  Lakeshia Lopes is a 58 y.o., female.    Anesthesia Evaluation    I have reviewed the Patient Summary Reports.    I have reviewed the Nursing Notes.      Review of Systems  Anesthesia Hx:  No problems with previous Anesthesia    Cardiovascular:  Cardiovascular Normal     Pulmonary:  Pulmonary Normal        Physical Exam  General:  Well nourished    Airway/Jaw/Neck:  Airway Findings: Mouth Opening: Normal Tongue: Normal  General Airway Assessment: Adult  Mallampati: II  TM Distance: Normal, at least 6 cm  Jaw/Neck Findings:  Neck ROM: Normal ROM     Eyes/Ears/Nose:  Eyes/Ears/Nose Findings:    Dental:  Dental Findings: In tact   Chest/Lungs:  Chest/Lungs Findings: Normal Respiratory Rate     Heart/Vascular:  Heart Findings: Rate: Normal  Rhythm: Regular Rhythm        Mental Status:  Mental Status Findings:  Cooperative, Alert and Oriented         Anesthesia Plan  Type of Anesthesia, risks & benefits discussed:  Anesthesia Type:  general  Patient's Preference: General  Intra-op Monitoring Plan: standard ASA monitors  Intra-op Monitoring Plan Comments:   Post Op Pain Control Plan: multimodal analgesia, peripheral nerve block, per primary service following discharge from PACU and IV/PO Opioids PRN  Post Op Pain Control Plan Comments:   Induction:   IV  Beta Blocker:  Patient is not currently on a Beta-Blocker (No further documentation required).       Informed Consent: Patient understands risks and agrees with Anesthesia plan.  Questions answered. Anesthesia consent signed with patient.  ASA Score: 2     Day of Surgery Review of History & Physical:    H&P update referred to the surgeon.         Ready For Surgery From Anesthesia Perspective.

## 2019-06-18 NOTE — INTERVAL H&P NOTE
The patient has been examined and the H&P has been reviewed:    I concur with the findings and no changes have occurred since H&P was written.    Anesthesia/Surgery risks, benefits and alternative options discussed and understood by patient/family.          Active Hospital Problems    Diagnosis  POA    Scapholunate advanced collapse of right wrist [M19.131]  Yes      Resolved Hospital Problems   No resolved problems to display.

## 2019-06-18 NOTE — ANESTHESIA POSTPROCEDURE EVALUATION
Anesthesia Post Evaluation    Patient: Lakeshia Lopes    Procedure(s) Performed: Procedure(s) (LRB):  DEBRIDEMENT, CARPAL BONE (Right)    Final Anesthesia Type: general  Patient location during evaluation: PACU  Patient participation: Yes- Able to Participate  Level of consciousness: awake and alert, oriented and awake  Post-procedure vital signs: reviewed and stable  Pain management: adequate  Airway patency: patent  PONV status at discharge: No PONV  Anesthetic complications: no      Cardiovascular status: blood pressure returned to baseline and hemodynamically stable  Respiratory status: unassisted, spontaneous ventilation and room air  Hydration status: euvolemic  Follow-up not needed.          Vitals Value Taken Time   /73 6/18/2019 12:27 PM   Temp 36.4 °C (97.5 °F) 6/18/2019 11:42 AM   Pulse 74 6/18/2019 12:27 PM   Resp 16 6/18/2019 12:27 PM   SpO2 100 % 6/18/2019 12:27 PM         Event Time     Out of Recovery 12:00:00          Pain/Red Score: Pain Rating Prior to Med Admin: 4 (6/18/2019  8:40 AM)  Red Score: 10 (6/18/2019 12:27 PM)

## 2019-06-18 NOTE — DISCHARGE SUMMARY
[facility]  Discharge Note  Short Stay    Admit Date: 6/18/2019    Discharge Date and Time: No discharge date for patient encounter.     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       Discharged Condition: good    Hospital Course: Patient was admitted for an outpatient procedure and tolerated the procedure well with no complications.    Final Diagnoses: Same as principal problem.    Disposition: Home or Self Care    Follow up/Patient Instructions:    Medications:  Reconciled Home Medications:      Medication List      START taking these medications    ondansetron 4 MG Tbdl  Commonly known as:  ZOFRAN-ODT  Take 1 tablet (4 mg total) by mouth every 8 (eight) hours as needed (Nausea).     oxyCODONE 10 mg Tab immediate release tablet  Commonly known as:  ROXICODONE  Take 1 tablet (10 mg total) by mouth every 4 (four) hours as needed for Pain.        CONTINUE taking these medications    aspirin 81 MG EC tablet  Commonly known as:  ECOTRIN  Take 81 mg by mouth once daily.     glucosamine-chondroitin 500-400 mg tablet  Take 1 tablet by mouth 3 (three) times daily.     meloxicam 15 MG tablet  Commonly known as:  MOBIC  Take 15 mg by mouth once daily.          Discharge Procedure Orders   [facility] [employer] [facility] FOR HOME USE     Diet general     Activity as tolerated     Keep surgical extremity elevated     Lifting restrictions     Call MD for:  temperature >100.4     Call MD for:  persistent nausea and vomiting     Call MD for:  severe uncontrolled pain     Call MD for:  difficulty breathing, headache or visual disturbances     Call MD for:  redness, tenderness, or signs of infection (pain, swelling, redness, odor or green/yellow discharge around incision site)     Call MD for:   hives     Call MD for:  persistent dizziness or light-headedness     Call MD for:  extreme fatigue     Leave dressing on - Keep it clean, dry, and intact until clinic visit     Follow-up Information     Alex Irvign MD In 2 weeks.    Specialty:  Orthopedic Surgery  Contact information:  [address] [place] [address]  [phone]                   Discharge Procedure Orders (must include Diet, Follow-up, Activity):   Discharge Procedure Orders (must include Diet, Follow-up, Activity)   [facility] [employer] [facility] FOR HOME USE     Diet general     Activity as tolerated     Keep surgical extremity elevated     Lifting restrictions     Call MD for:  temperature >100.4     Call MD for:  persistent nausea and vomiting     Call MD for:  severe uncontrolled pain     Call MD for:  difficulty breathing, headache or visual disturbances     Call MD for:  redness, tenderness, or signs of infection (pain, swelling, redness, odor or green/yellow discharge around incision site)     Call MD for:  hives     Call MD for:  persistent dizziness or light-headedness     Call MD for:  extreme fatigue     Leave dressing on - Keep it clean, dry, and intact until clinic visit

## 2019-06-18 NOTE — TRANSFER OF CARE
"Anesthesia Transfer of Care Note    Patient: Lakeshia Lopes    Procedure(s) Performed: Procedure(s) (LRB):  DEBRIDEMENT, CARPAL BONE (Right)    Patient location: PACU    Anesthesia Type: general    Transport from OR: Transported from OR on room air with adequate spontaneous ventilation    Post pain: adequate analgesia    Post assessment: no apparent anesthetic complications and tolerated procedure well    Post vital signs: stable    Level of consciousness: awake    Nausea/Vomiting: no nausea/vomiting    Complications: none    Transfer of care protocol was followed      Last vitals:   Visit Vitals  /68 (BP Location: Left arm, Patient Position: Lying)   Pulse (!) 51   Temp 36.7 °C (98 °F) (Skin)   Resp 16   Ht 5' 7" (1.702 m)   Wt 76.7 kg (169 lb 1.5 oz)   SpO2 97%   Breastfeeding? No   BMI 26.48 kg/m²     "

## 2019-06-19 VITALS
SYSTOLIC BLOOD PRESSURE: 132 MMHG | TEMPERATURE: 98 F | WEIGHT: 169.06 LBS | OXYGEN SATURATION: 100 % | BODY MASS INDEX: 26.53 KG/M2 | DIASTOLIC BLOOD PRESSURE: 73 MMHG | HEIGHT: 67 IN | HEART RATE: 74 BPM | RESPIRATION RATE: 16 BRPM

## 2019-07-03 ENCOUNTER — DOCUMENTATION ONLY (OUTPATIENT)
Dept: ORTHOPEDICS | Facility: CLINIC | Age: 59
End: 2019-07-03

## 2019-07-03 ENCOUNTER — OFFICE VISIT (OUTPATIENT)
Dept: ORTHOPEDICS | Facility: CLINIC | Age: 59
End: 2019-07-03
Payer: COMMERCIAL

## 2019-07-03 ENCOUNTER — HOSPITAL ENCOUNTER (OUTPATIENT)
Dept: RADIOLOGY | Facility: HOSPITAL | Age: 59
Discharge: HOME | End: 2019-07-03
Attending: ORTHOPAEDIC SURGERY
Payer: COMMERCIAL

## 2019-07-03 VITALS
HEART RATE: 63 BPM | WEIGHT: 169.06 LBS | DIASTOLIC BLOOD PRESSURE: 75 MMHG | BODY MASS INDEX: 26.53 KG/M2 | HEIGHT: 67 IN | SYSTOLIC BLOOD PRESSURE: 104 MMHG

## 2019-07-03 DIAGNOSIS — M19.131 SCAPHOLUNATE ADVANCED COLLAPSE OF RIGHT WRIST: Primary | ICD-10-CM

## 2019-07-03 DIAGNOSIS — M19.131 SCAPHOLUNATE ADVANCED COLLAPSE OF RIGHT WRIST: ICD-10-CM

## 2019-07-03 PROCEDURE — 99024 POSTOP FOLLOW-UP VISIT: CPT | Mod: S$GLB,,, | Performed by: ORTHOPAEDIC SURGERY

## 2019-07-03 PROCEDURE — 99999 PR PBB SHADOW E&M-EST. PATIENT-LVL III: CPT | Mod: PBBFAC,,, | Performed by: ORTHOPAEDIC SURGERY

## 2019-07-03 PROCEDURE — 99999 PR PBB SHADOW E&M-EST. PATIENT-LVL III: ICD-10-PCS | Mod: PBBFAC,,, | Performed by: ORTHOPAEDIC SURGERY

## 2019-07-03 PROCEDURE — 29075 APPL CST ELBW FNGR SHORT ARM: CPT | Mod: 58,RT,S$GLB, | Performed by: ORTHOPAEDIC SURGERY

## 2019-07-03 PROCEDURE — 29075 PR APPLY FOREARM CAST: ICD-10-PCS | Mod: 58,RT,S$GLB, | Performed by: ORTHOPAEDIC SURGERY

## 2019-07-03 PROCEDURE — 99024 PR POST-OP FOLLOW-UP VISIT: ICD-10-PCS | Mod: S$GLB,,, | Performed by: ORTHOPAEDIC SURGERY

## 2019-07-03 NOTE — PROGRESS NOTES
0822 - called into patient's room 12 by LORE Moyer stating Dr Irving needs help.  Entering room Dr Irving stated pt had a vagal response.  Pt presents sitting in chair, spontaneous eye movement and providing appropriate verbal responses.  Assisted pt to exam table and laid pt in supine position.  Pulse: 67 BP:  84/65.  Applied cold rags to patient's forehead and neck.  Pt maintained consciousness while I was present.  Pt drank 30 oz of water. 0840 - Raised head of bed to semi woo's position. Pt reported she no longer feels lightheaded but a little nauseated and tolerating position change.  0900 - Pulse: 43  BP: 100/66.  Raised head of bed to high fowlers position.  Pt tolerated position change well and reports no nausea, lightheadedness, or weakness.  0911 - I reported vital signs to Dr Irving in patient's room.  Dr Irving approved pt to leave clinic.  I assisted patient in wheel chair to her 's truck.  Pt transferred from wheelchair to truck without incident.

## 2019-07-03 NOTE — PROGRESS NOTES
Ms Lopes returns to clinic today. She is 2 weeks status post right wrist proximal row carpectomy.  She has been doing relatively well. She has had some swelling to her fingers.  She has no other complaints.    Physical exam:  Examination the right wrist and hand reveals that the incision is healing well.  There is minimal edema.  There is no erythema.  There is no drainage noted.  She is neurovascularly intact in the median radial and ulnar distributions with capillary refill less than 2 sec    Assessment:  Right wrist proximal row carpectomy    Plan:    1.  The patient did have a syncopal episode from vagal response to moving her hand.  She recovered from that well and did not have any residual complaints.    2.  Sutures were removed and Steri-Strips are placed    3.  She was placed into a short-arm cast    4.  She will continue aggressive range of motion of the hand and fingers    5.  She will follow up with me in 2 weeks with an x-ray of the right wrist at which time I will go to a Velcro brace and begin range of motion

## 2019-07-16 DIAGNOSIS — M19.131 SCAPHOLUNATE ADVANCED COLLAPSE OF RIGHT WRIST: Primary | ICD-10-CM

## 2019-07-17 ENCOUNTER — OFFICE VISIT (OUTPATIENT)
Dept: ORTHOPEDICS | Facility: CLINIC | Age: 59
End: 2019-07-17
Payer: COMMERCIAL

## 2019-07-17 ENCOUNTER — HOSPITAL ENCOUNTER (OUTPATIENT)
Dept: RADIOLOGY | Facility: HOSPITAL | Age: 59
Discharge: HOME | End: 2019-07-17
Attending: ORTHOPAEDIC SURGERY
Payer: COMMERCIAL

## 2019-07-17 VITALS
WEIGHT: 169.06 LBS | DIASTOLIC BLOOD PRESSURE: 75 MMHG | HEART RATE: 60 BPM | SYSTOLIC BLOOD PRESSURE: 113 MMHG | HEIGHT: 67 IN | BODY MASS INDEX: 26.53 KG/M2

## 2019-07-17 DIAGNOSIS — M19.131 SCAPHOLUNATE ADVANCED COLLAPSE OF RIGHT WRIST: Primary | ICD-10-CM

## 2019-07-17 DIAGNOSIS — M19.131 SCAPHOLUNATE ADVANCED COLLAPSE OF RIGHT WRIST: ICD-10-CM

## 2019-07-17 PROCEDURE — 73110 XR WRIST COMPLETE 3 VIEWS RIGHT: ICD-10-PCS | Mod: 26,RT,, | Performed by: RADIOLOGY

## 2019-07-17 PROCEDURE — 73110 X-RAY EXAM OF WRIST: CPT | Mod: 26,RT,, | Performed by: RADIOLOGY

## 2019-07-17 PROCEDURE — 99024 POSTOP FOLLOW-UP VISIT: CPT | Mod: S$GLB,,, | Performed by: ORTHOPAEDIC SURGERY

## 2019-07-17 PROCEDURE — 99999 PR PBB SHADOW E&M-EST. PATIENT-LVL III: CPT | Mod: PBBFAC,,, | Performed by: ORTHOPAEDIC SURGERY

## 2019-07-17 PROCEDURE — 99999 PR PBB SHADOW E&M-EST. PATIENT-LVL III: ICD-10-PCS | Mod: PBBFAC,,, | Performed by: ORTHOPAEDIC SURGERY

## 2019-07-17 PROCEDURE — 73110 X-RAY EXAM OF WRIST: CPT | Mod: TC,PO,RT

## 2019-07-17 PROCEDURE — 99024 PR POST-OP FOLLOW-UP VISIT: ICD-10-PCS | Mod: S$GLB,,, | Performed by: ORTHOPAEDIC SURGERY

## 2019-07-17 NOTE — PROGRESS NOTES
Ms Lopes returns to clinic today.  She is approximately 4 weeks status post right wrist proximal row carpectomy.  She is overall doing well.  There are no major complaints.  She states that her pain is improving.    Physical exam:  Examination of the right wrist reveals that the incision is well healed.  There is no edema or erythema.  She does report intact sensation in the median radial and ulnar distribution.  She has capillary refill less than 2 sec to all fingers.    Radiology:  X-rays of the right wrist were taken in clinic today.  She is noted have evidence of the proximal row carpectomy.  The carpus remains well aligned and is stably located    Assessment:  Status post right wrist proximal row carpectomy    Plan:    1.  I will place her into a Velcro splint    2.  She will begin a home gentle range of motion program    3.  She will continue with 2-3 lb weight limit    4.  She will follow up with me and 2 weeks with repeat x-ray of the right wrist at which time I will consider setting her up with occupational therapy

## 2019-07-30 DIAGNOSIS — M19.131 SCAPHOLUNATE ADVANCED COLLAPSE OF RIGHT WRIST: Primary | ICD-10-CM

## 2019-07-31 ENCOUNTER — HOSPITAL ENCOUNTER (OUTPATIENT)
Dept: RADIOLOGY | Facility: HOSPITAL | Age: 59
Discharge: HOME | End: 2019-07-31
Attending: ORTHOPAEDIC SURGERY
Payer: COMMERCIAL

## 2019-07-31 ENCOUNTER — OFFICE VISIT (OUTPATIENT)
Dept: ORTHOPEDICS | Facility: CLINIC | Age: 59
End: 2019-07-31
Payer: COMMERCIAL

## 2019-07-31 VITALS
DIASTOLIC BLOOD PRESSURE: 76 MMHG | BODY MASS INDEX: 26.37 KG/M2 | HEIGHT: 67 IN | SYSTOLIC BLOOD PRESSURE: 115 MMHG | WEIGHT: 168 LBS | HEART RATE: 67 BPM

## 2019-07-31 DIAGNOSIS — Z98.890 STATUS POST PROXIMAL ROW CARPECTOMY OF WRIST: ICD-10-CM

## 2019-07-31 DIAGNOSIS — M19.131 SCAPHOLUNATE ADVANCED COLLAPSE OF RIGHT WRIST: ICD-10-CM

## 2019-07-31 DIAGNOSIS — M19.131 SCAPHOLUNATE ADVANCED COLLAPSE OF RIGHT WRIST: Primary | ICD-10-CM

## 2019-07-31 PROCEDURE — 99024 POSTOP FOLLOW-UP VISIT: CPT | Mod: S$GLB,,, | Performed by: ORTHOPAEDIC SURGERY

## 2019-07-31 PROCEDURE — 73110 XR WRIST COMPLETE 3 VIEWS RIGHT: ICD-10-PCS | Mod: 26,RT,, | Performed by: RADIOLOGY

## 2019-07-31 PROCEDURE — 99024 PR POST-OP FOLLOW-UP VISIT: ICD-10-PCS | Mod: S$GLB,,, | Performed by: ORTHOPAEDIC SURGERY

## 2019-07-31 PROCEDURE — 73110 X-RAY EXAM OF WRIST: CPT | Mod: 26,RT,, | Performed by: RADIOLOGY

## 2019-07-31 PROCEDURE — 99999 PR PBB SHADOW E&M-EST. PATIENT-LVL III: ICD-10-PCS | Mod: PBBFAC,,, | Performed by: ORTHOPAEDIC SURGERY

## 2019-07-31 PROCEDURE — 99999 PR PBB SHADOW E&M-EST. PATIENT-LVL III: CPT | Mod: PBBFAC,,, | Performed by: ORTHOPAEDIC SURGERY

## 2019-07-31 PROCEDURE — 73110 X-RAY EXAM OF WRIST: CPT | Mod: TC,PO,RT

## 2019-07-31 NOTE — PROGRESS NOTES
Ms Lopes returns to clinic today.  She is approximately 6 weeks status post right wrist proximal row carpectomy.  She is doing very well.  There are no major complaints.    Physical exam:  Examination the right wrist reveals that the incision is well healed.  There is minimal edema.  There is no erythema.  Range of motion of the wrist is limited with extension of 10° and flexion of 10°.  She is able to flex to within 1 cm of her distal palmar crease. She does report intact sensation in the median radial and ulnar distribution.  Capillary refill is less than 2 sec.    Radiology:  X-rays of the right wrist were taken in clinic today.  There is noted to be evidence of the proximal row carpectomy.  The wrist remains well aligned    Assessment:  Status post right wrist proximal row carpectomy    Plan:      1.  She will begin to wean Out of the Velcro brace    2.  I will set her up with occupational therapy to begin edema control and range of motion     3.  She will follow up with me in 4 weeks for repeat evaluation

## 2019-08-02 ENCOUNTER — TELEPHONE (OUTPATIENT)
Dept: ORTHOPEDICS | Facility: CLINIC | Age: 59
End: 2019-08-02

## 2019-08-02 NOTE — TELEPHONE ENCOUNTER
Spoke to patient. Advised that I attempted to contact [employer]. No answer. Did send a fax with an urgent request for [facility] to contact patient asap to schedule first appt. Instructed pt to return call to office if she has not heard from [employer] by Monday or Tuesday of next week. Pt stated understanding.

## 2019-08-02 NOTE — TELEPHONE ENCOUNTER
----- Message from Alcira Gamez sent at 8/2/2019 12:57 PM CDT -----  Contact: Patient  Type: Needs Medical Advice    Who Called: Patient  Best Call Back Number:   Additional Information: Calling to speak with the Nurse. She hasn't heard from [facility] yet. Please advise

## 2019-08-28 ENCOUNTER — OFFICE VISIT (OUTPATIENT)
Dept: ORTHOPEDICS | Facility: CLINIC | Age: 59
End: 2019-08-28
Payer: COMMERCIAL

## 2019-08-28 VITALS
WEIGHT: 168 LBS | HEART RATE: 67 BPM | BODY MASS INDEX: 26.37 KG/M2 | SYSTOLIC BLOOD PRESSURE: 117 MMHG | HEIGHT: 67 IN | DIASTOLIC BLOOD PRESSURE: 84 MMHG

## 2019-08-28 DIAGNOSIS — Z98.890 STATUS POST PROXIMAL ROW CARPECTOMY OF WRIST: Primary | ICD-10-CM

## 2019-08-28 PROCEDURE — 99024 PR POST-OP FOLLOW-UP VISIT: ICD-10-PCS | Mod: S$GLB,,, | Performed by: ORTHOPAEDIC SURGERY

## 2019-08-28 PROCEDURE — 99999 PR PBB SHADOW E&M-EST. PATIENT-LVL III: ICD-10-PCS | Mod: PBBFAC,,, | Performed by: ORTHOPAEDIC SURGERY

## 2019-08-28 PROCEDURE — 99999 PR PBB SHADOW E&M-EST. PATIENT-LVL III: CPT | Mod: PBBFAC,,, | Performed by: ORTHOPAEDIC SURGERY

## 2019-08-28 PROCEDURE — 99024 POSTOP FOLLOW-UP VISIT: CPT | Mod: S$GLB,,, | Performed by: ORTHOPAEDIC SURGERY

## 2019-08-29 NOTE — PROGRESS NOTES
Ms Lopes returns to clinic today.  She is approximately 9 weeks status post right wrist proximal row carpectomy.  She states that although we ordered therapy 4 weeks ago she only began getting therapy 1 week ago.  She states that she now has a regularly scheduled visits.  She states that there was difficulty getting her scheduled due to approval from insurance.  She currently is complaining of stiffness in the hand and wrist with feels as if it is getting better.  She also has significantly improved pain.    Physical exam:  Examination of the right hand and wrist reveals that there is well-healed incision.  There is minimal edema.  There is no erythema.  Palpation produces only mild tenderness. She is able flex to within 1 cm of the distal palmar crease and extend the fingers.  Wrist range of motion is limited with extension of 20° and flexion of 30°.  She does have sensation intact in the median radial ulnar distribution and capillary refill less than 2 sec    Assessment:  Status post right wrist proximal row carpectomy    Plan:    1.  She will continue to work with occupational therapy for edema control and range of motion    2.  She will discontinue the brace completely    3.  She will follow up with me in 3-4 weeks for repeat evaluation

## 2019-09-06 ENCOUNTER — TELEPHONE (OUTPATIENT)
Dept: ORTHOPEDICS | Facility: CLINIC | Age: 59
End: 2019-09-06

## 2019-09-06 NOTE — TELEPHONE ENCOUNTER
----- Message from Lorraine Catalan sent at 9/6/2019 10:09 AM CDT -----  Contact: [phone]  Patient is requesting a call back from the nurse want to know was most recent office visit notes send to employer? Patient stated notes was not received.   Please call the patient upon request at phone number [phone].

## 2019-09-12 ENCOUNTER — TELEPHONE (OUTPATIENT)
Dept: ORTHOPEDICS | Facility: CLINIC | Age: 59
End: 2019-09-12

## 2019-09-12 NOTE — TELEPHONE ENCOUNTER
Kym stated patient's leave is covered until 9/24/19 and has a scheduled appointment with provider on 9/25/19.  After visit patient's visit notes will be reviewed by [employer] to determine further leave coverage.  Kym informed me not interventions from this office are needed to address patient's claim at this time.

## 2019-09-12 NOTE — TELEPHONE ENCOUNTER
----- Message from Liliam Patino sent at 9/12/2019 11:22 AM CDT -----  Contact: Lakeshia walter  Type: Needs Medical Advice    Who Called:  Lakeshia  Best Call Back Number: [phone] or [phone]  Additional Information: Pls call Lakeshia regarding her paperwork for short term disability. There is an issue w/ it.

## 2019-09-12 NOTE — TELEPHONE ENCOUNTER
Pt stating [employer] informed her that her leave is approved until 9/24/19. Pt has appointment with Dr Irving on 9/25 and is not currently covered for the leave.  Pt states [employer] requesting statement from provider that an error was made in the request for medical leave.  I informed Lakeshia that I do not see in Dr Irving's documentation any information regarding her leave ending on 9/24.  Pt provided me with [employer] information for her claim:  Ph - [phone]  Case # [id number].   I informed pt that I will contact [employer] to get a better understanding of issue and to resolve it.

## 2019-09-12 NOTE — TELEPHONE ENCOUNTER
I informed pt of conversation with Kym and advised pt to contact [employer] to make sure there wasn't a misunderstanding that patient needs to address.  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

## 2019-09-25 ENCOUNTER — OFFICE VISIT (OUTPATIENT)
Dept: ORTHOPEDICS | Facility: CLINIC | Age: 59
End: 2019-09-25
Payer: COMMERCIAL

## 2019-09-25 VITALS
HEART RATE: 64 BPM | BODY MASS INDEX: 26.37 KG/M2 | DIASTOLIC BLOOD PRESSURE: 89 MMHG | HEIGHT: 67 IN | WEIGHT: 168 LBS | SYSTOLIC BLOOD PRESSURE: 142 MMHG

## 2019-09-25 DIAGNOSIS — Z98.890 STATUS POST PROXIMAL ROW CARPECTOMY OF WRIST: Primary | ICD-10-CM

## 2019-09-25 PROCEDURE — 99999 PR PBB SHADOW E&M-EST. PATIENT-LVL III: ICD-10-PCS | Mod: PBBFAC,,, | Performed by: ORTHOPAEDIC SURGERY

## 2019-09-25 PROCEDURE — 99999 PR PBB SHADOW E&M-EST. PATIENT-LVL III: CPT | Mod: PBBFAC,,, | Performed by: ORTHOPAEDIC SURGERY

## 2019-09-25 PROCEDURE — 99024 PR POST-OP FOLLOW-UP VISIT: ICD-10-PCS | Mod: S$GLB,,, | Performed by: ORTHOPAEDIC SURGERY

## 2019-09-25 PROCEDURE — 99024 POSTOP FOLLOW-UP VISIT: CPT | Mod: S$GLB,,, | Performed by: ORTHOPAEDIC SURGERY

## 2019-09-25 RX ORDER — MELOXICAM 15 MG/1
15 TABLET ORAL DAILY
Qty: 30 TABLET | Refills: 0 | Status: SHIPPED | OUTPATIENT
Start: 2019-09-25 | End: 2019-12-02 | Stop reason: SDUPTHER

## 2019-09-25 NOTE — PROGRESS NOTES
Ms Lopes returns to clinic today. She has a history of right wrist proximal row carpectomy.  She is still working with therapy.  She feels as if she is slowly improving.  She still has weakness and stiffness in the wrist she does not report any significant pain.    Physical exam:  Examination of the right wrist reveals that there is no edema.  The incision is well healed.  Palpation produces mild tenderness over the wrist. She is able make a full composite fist and fully extend the fingers.  She is neurovascularly intact.    Assessment:  Status post right proximal row carpectomy    Plan:    1.  She will continue to work with therapy    2.  Will continue to hold her out of work as she has no use of the right hand for work duties    3.  She will follow up with me in 4 weeks at which point I will most likely allow her to return to work duties at that time.  Her expected return to work date is October 28, 2019.    4.  I will start her on Mobic 15 mg daily

## 2019-10-03 ENCOUNTER — TELEPHONE (OUTPATIENT)
Dept: ORTHOPEDICS | Facility: CLINIC | Age: 59
End: 2019-10-03

## 2019-10-03 NOTE — TELEPHONE ENCOUNTER
Pt stating they have not received patient's last office notes.  I informed pt I will fax her last office visit today.  Pt requested I list Claim and Win numbers on cover sheet.  Fax # [phone]  Claim # [id number]  Win # [id number]

## 2019-10-03 NOTE — TELEPHONE ENCOUNTER
----- Message from Jessica Fowler sent at 10/3/2019 12:13 PM CDT -----  Type: Needs Medical Advice    Who Called:  Patient  Best Call Back Number: [phone]  Additional Information: Patient requesting to speak with nurse (Prince)concerning office visit notes/please call patient back to advise.

## 2019-10-23 ENCOUNTER — OFFICE VISIT (OUTPATIENT)
Dept: ORTHOPEDICS | Facility: CLINIC | Age: 59
End: 2019-10-23
Payer: COMMERCIAL

## 2019-10-23 VITALS — HEIGHT: 67 IN | WEIGHT: 168 LBS | BODY MASS INDEX: 26.37 KG/M2

## 2019-10-23 DIAGNOSIS — Z98.890 STATUS POST PROXIMAL ROW CARPECTOMY OF WRIST: Primary | ICD-10-CM

## 2019-10-23 PROCEDURE — 99999 PR PBB SHADOW E&M-EST. PATIENT-LVL II: ICD-10-PCS | Mod: PBBFAC,,, | Performed by: ORTHOPAEDIC SURGERY

## 2019-10-23 PROCEDURE — 3008F BODY MASS INDEX DOCD: CPT | Mod: CPTII,S$GLB,, | Performed by: ORTHOPAEDIC SURGERY

## 2019-10-23 PROCEDURE — 99999 PR PBB SHADOW E&M-EST. PATIENT-LVL II: CPT | Mod: PBBFAC,,, | Performed by: ORTHOPAEDIC SURGERY

## 2019-10-23 PROCEDURE — 99213 OFFICE O/P EST LOW 20 MIN: CPT | Mod: S$GLB,,, | Performed by: ORTHOPAEDIC SURGERY

## 2019-10-23 PROCEDURE — 3008F PR BODY MASS INDEX (BMI) DOCUMENTED: ICD-10-PCS | Mod: CPTII,S$GLB,, | Performed by: ORTHOPAEDIC SURGERY

## 2019-10-23 PROCEDURE — 99213 PR OFFICE/OUTPT VISIT, EST, LEVL III, 20-29 MIN: ICD-10-PCS | Mod: S$GLB,,, | Performed by: ORTHOPAEDIC SURGERY

## 2019-10-23 NOTE — PROGRESS NOTES
Ms Lopes returns to clinic today. She is status post right wrist proximal row carpectomy.  She states that she still has some weakness in her wrist as well as decreased motion.  She is also complaining of mild numbness of her ring and small fingers as well as pain over the radial side of the wrist.    Physical exam:  Examination the right wrist and hand reveals that there is still mild edema.  The wounds are well healed.  She is able to flex to the distal palmar crease and fully extend.  Wrist range of motion is extension of 50° and flexion of 40°.  She does have full pronation and supination.  She does have tenderness overlying the region of the radial styloid.  She does report decreased sensation in the ulnar distribution when compared to the median and radial distributions.  She has a mildly positive Tinel's overlying the elbow and overlying the wrist. She has 5/5 intrinsic strength. She has capillary refill less than 2 sec in the digits.    Assessment:  Status post right wrist proximal row carpectomy    Plan:    1.  The patient is allowed to return to work but she has a 10 lb weight limit to the right hand    2.  She will continue strengthening exercises at home    3.  She will follow up with me in 4 weeks.  If she is continuing to complain of numbness and may send her for a nerve conduction study.    4.  At her next follow-up if she continues to complain of radial sided wrist pain on also may consider steroid injection at the region of the radial styloid.

## 2019-11-08 ENCOUNTER — TELEPHONE (OUTPATIENT)
Dept: ORTHOPEDICS | Facility: CLINIC | Age: 59
End: 2019-11-08

## 2019-11-21 ENCOUNTER — TELEPHONE (OUTPATIENT)
Dept: ORTHOPEDICS | Facility: CLINIC | Age: 59
End: 2019-11-21

## 2019-11-21 NOTE — TELEPHONE ENCOUNTER
Called to inform pt that I have faxed last office notes from provider to [employer] as requested.  No answer, voicemail not set up.  Unable to leave message.

## 2019-11-21 NOTE — TELEPHONE ENCOUNTER
----- Message from Roland Gregory MA sent at 11/21/2019 11:24 AM CST -----  Contact: Patient  Patient needs office visit notes sent over to [employer]BENA chun  Call back

## 2019-12-02 ENCOUNTER — OFFICE VISIT (OUTPATIENT)
Dept: ORTHOPEDICS | Facility: CLINIC | Age: 59
End: 2019-12-02
Payer: COMMERCIAL

## 2019-12-02 VITALS
HEIGHT: 67 IN | WEIGHT: 168 LBS | HEART RATE: 67 BPM | BODY MASS INDEX: 26.37 KG/M2 | DIASTOLIC BLOOD PRESSURE: 85 MMHG | SYSTOLIC BLOOD PRESSURE: 141 MMHG

## 2019-12-02 DIAGNOSIS — Z98.890 STATUS POST PROXIMAL ROW CARPECTOMY OF WRIST: Primary | ICD-10-CM

## 2019-12-02 PROCEDURE — 3008F BODY MASS INDEX DOCD: CPT | Mod: CPTII,S$GLB,, | Performed by: ORTHOPAEDIC SURGERY

## 2019-12-02 PROCEDURE — 3008F PR BODY MASS INDEX (BMI) DOCUMENTED: ICD-10-PCS | Mod: CPTII,S$GLB,, | Performed by: ORTHOPAEDIC SURGERY

## 2019-12-02 PROCEDURE — 99213 OFFICE O/P EST LOW 20 MIN: CPT | Mod: S$GLB,,, | Performed by: ORTHOPAEDIC SURGERY

## 2019-12-02 PROCEDURE — 99999 PR PBB SHADOW E&M-EST. PATIENT-LVL III: CPT | Mod: PBBFAC,,, | Performed by: ORTHOPAEDIC SURGERY

## 2019-12-02 PROCEDURE — 99999 PR PBB SHADOW E&M-EST. PATIENT-LVL III: ICD-10-PCS | Mod: PBBFAC,,, | Performed by: ORTHOPAEDIC SURGERY

## 2019-12-02 PROCEDURE — 99213 PR OFFICE/OUTPT VISIT, EST, LEVL III, 20-29 MIN: ICD-10-PCS | Mod: S$GLB,,, | Performed by: ORTHOPAEDIC SURGERY

## 2019-12-02 RX ORDER — MELOXICAM 15 MG/1
15 TABLET ORAL DAILY
Qty: 30 TABLET | Refills: 0 | Status: SHIPPED | OUTPATIENT
Start: 2019-12-02

## 2019-12-03 NOTE — PROGRESS NOTES
Ms Lopes returns to clinic today.  She has a history of right wrist proximal row carpectomy.  She is overall significantly improved.  She is not reporting any continued pain.  She still has some limitation of motion.    Physical exam:  Examination the right wrist and hand reveals that the incision is well healed.  There is no edema or erythema.  Range of motion is somewhat limited with extension of 45° and flexion of 50°.  She does have full pronation and supination.  Has a 2+ radial pulse and sensation is intact    Assessment:  Status post right wrist proximal row carpectomy    Plan:    1.  We can begin to increase her weight-bearing up to a 25 lb max    2.  She will follow up with me in 3-4 months for final evaluation

## 2020-05-18 ENCOUNTER — OFFICE VISIT (OUTPATIENT)
Dept: ORTHOPEDICS | Facility: CLINIC | Age: 60
End: 2020-05-18
Payer: COMMERCIAL

## 2020-05-18 VITALS
DIASTOLIC BLOOD PRESSURE: 77 MMHG | HEIGHT: 67 IN | WEIGHT: 168 LBS | SYSTOLIC BLOOD PRESSURE: 128 MMHG | TEMPERATURE: 98 F | BODY MASS INDEX: 26.37 KG/M2 | RESPIRATION RATE: 18 BRPM | HEART RATE: 58 BPM

## 2020-05-18 DIAGNOSIS — Z98.890 STATUS POST PROXIMAL ROW CARPECTOMY OF WRIST: Primary | ICD-10-CM

## 2020-05-18 PROCEDURE — 99999 PR PBB SHADOW E&M-EST. PATIENT-LVL III: CPT | Mod: PBBFAC,,, | Performed by: ORTHOPAEDIC SURGERY

## 2020-05-18 PROCEDURE — 99999 PR PBB SHADOW E&M-EST. PATIENT-LVL III: ICD-10-PCS | Mod: PBBFAC,,, | Performed by: ORTHOPAEDIC SURGERY

## 2020-05-18 PROCEDURE — 3008F BODY MASS INDEX DOCD: CPT | Mod: CPTII,S$GLB,, | Performed by: ORTHOPAEDIC SURGERY

## 2020-05-18 PROCEDURE — 99213 OFFICE O/P EST LOW 20 MIN: CPT | Mod: S$GLB,,, | Performed by: ORTHOPAEDIC SURGERY

## 2020-05-18 PROCEDURE — 99213 PR OFFICE/OUTPT VISIT, EST, LEVL III, 20-29 MIN: ICD-10-PCS | Mod: S$GLB,,, | Performed by: ORTHOPAEDIC SURGERY

## 2020-05-18 PROCEDURE — 3008F PR BODY MASS INDEX (BMI) DOCUMENTED: ICD-10-PCS | Mod: CPTII,S$GLB,, | Performed by: ORTHOPAEDIC SURGERY

## 2020-05-18 NOTE — LETTER
May 18, 2020    Lakeshia Lopes  [address]  [place] [address]         [facility]  [address]  [place] [address]  Phone: [phone] May 18, 2020     Patient: Lakeshia Lopes   YOB: 1960   Date of Visit: 5/18/2020       To Whom It May Concern:    It is my medical opinion that Lakeshia Lopes may return to work on 5/18/20 with a permanent restriction:  no lifting or carrying more than 25 pounds with her right hand and arm.    If you have any questions or concerns, please don't hesitate to call.    Sincerely,        Alex Irving MD

## 2020-05-18 NOTE — PROGRESS NOTES
Ms Lopes returns to clinic today.  She has a history of right wrist proximal row carpectomy.  States that overall she is doing very well.  States that her pain is 90% improved.  She states that she has returned to the ability of lifting most objects.  She has no new complaints.    Physical exam:  Examination the right wrist and hand reveals the incision is well healed.  There is no significant edema.  Palpation produces no specific tenderness.  She is able make a full composite fist and fully extend the fingers.  Wrist range of motion is extension of 40° and flexion of 30°.  She has full pronation and supination.  She is neurovascularly intact.    Assessment:  Status post right wrist proximal row carpectomy    Plan:    1.  She will continue a permanent 25 lb weight restriction to the right hand    2.  She is able to resume activity as tolerated otherwise    3.  She will follow up with me on a p.r.n. basis

## (undated) DEVICE — ALCOHOL 70% ISOP RUBBING 4OZ

## (undated) DEVICE — SUT PROLENE 4-0 MONO 18IN

## (undated) DEVICE — SEE MEDLINE ITEM 152487

## (undated) DEVICE — SUT ETHILON 4-0 PS2 18 BLK

## (undated) DEVICE — RUBBERBAND STERILE 3X1/8IN

## (undated) DEVICE — SEE MEDLINE ITEM 152622

## (undated) DEVICE — SEE MEDLINE ITEM 152514

## (undated) DEVICE — SUT 2-0 VICRYL / SH (J417)

## (undated) DEVICE — SUT 3-0 VICRYL / SH (J416)

## (undated) DEVICE — Device

## (undated) DEVICE — DRAPE PLASTIC U 60X72

## (undated) DEVICE — GLOVE PROTEXIS LTX MICRO 8

## (undated) DEVICE — SEE L#120831

## (undated) DEVICE — TOURNIQUET SB QC DP 18X4IN

## (undated) DEVICE — SUT ETHILON 3-0 PS2 18 BLK

## (undated) DEVICE — DRESSING XEROFORM FOIL PK 1X8

## (undated) DEVICE — NDL 27G X 1 1/4

## (undated) DEVICE — SEE MEDLINE ITEM 157131

## (undated) DEVICE — GLOVE PROTEXIS LTX MICRO  7.5

## (undated) DEVICE — APPLICATOR CHLORAPREP CLR 10.5

## (undated) DEVICE — GAUZE SPONGE 4X4 12PLY

## (undated) DEVICE — SUT ET GRN BR 3-0 CR 36 SH1

## (undated) DEVICE — SEE MEDLINE ITEM 157128

## (undated) DEVICE — APPLICATOR CHLORAPREP ORN 26ML

## (undated) DEVICE — SEE MEDLINE ITEM 157173

## (undated) DEVICE — SEE MEDLINE ITEM 146313

## (undated) DEVICE — BANDAGE ESMARK LATEX FREE 4INX

## (undated) DEVICE — SLING ORTHOPEDIC LARGE

## (undated) DEVICE — DRAPE STERI-DRAPE 1000 17X11IN

## (undated) DEVICE — FORCEP STRAIGHT DISP

## (undated) DEVICE — CORD BIPOLAR 12 FOOT

## (undated) DEVICE — SYR 10CC LUER LOCK

## (undated) DEVICE — PAD CAST SPECIALIST STRL 3